# Patient Record
Sex: MALE | Race: WHITE | NOT HISPANIC OR LATINO | Employment: FULL TIME | URBAN - METROPOLITAN AREA
[De-identification: names, ages, dates, MRNs, and addresses within clinical notes are randomized per-mention and may not be internally consistent; named-entity substitution may affect disease eponyms.]

---

## 2021-06-21 ENCOUNTER — APPOINTMENT (OUTPATIENT)
Dept: GENERAL RADIOLOGY | Facility: HOSPITAL | Age: 41
End: 2021-06-21

## 2021-06-21 ENCOUNTER — HOSPITAL ENCOUNTER (EMERGENCY)
Facility: HOSPITAL | Age: 41
Discharge: HOME | End: 2021-06-21
Attending: EMERGENCY MEDICINE | Admitting: EMERGENCY MEDICINE

## 2021-06-21 VITALS
DIASTOLIC BLOOD PRESSURE: 91 MMHG | HEART RATE: 82 BPM | SYSTOLIC BLOOD PRESSURE: 120 MMHG | RESPIRATION RATE: 16 BRPM | OXYGEN SATURATION: 97 % | HEIGHT: 73 IN | TEMPERATURE: 97.5 F

## 2021-06-21 DIAGNOSIS — S63.259A DISLOCATION OF FINGER, INITIAL ENCOUNTER: Primary | ICD-10-CM

## 2021-06-21 PROCEDURE — 25010000003 LIDOCAINE 1 % SOLUTION: Performed by: EMERGENCY MEDICINE

## 2021-06-21 PROCEDURE — 99282 EMERGENCY DEPT VISIT SF MDM: CPT

## 2021-06-21 PROCEDURE — 73140 X-RAY EXAM OF FINGER(S): CPT

## 2021-06-21 RX ORDER — LIDOCAINE HYDROCHLORIDE 10 MG/ML
10 INJECTION, SOLUTION INFILTRATION; PERINEURAL ONCE
Status: COMPLETED | OUTPATIENT
Start: 2021-06-21 | End: 2021-06-21

## 2021-06-21 RX ADMIN — LIDOCAINE HYDROCHLORIDE 10 ML: 10 INJECTION, SOLUTION INFILTRATION; PERINEURAL at 22:17

## 2021-06-22 NOTE — ED PROVIDER NOTES
EMERGENCY DEPARTMENT ENCOUNTER    Room Number:  35/35  Date of encounter:  6/21/2021  PCP: Provider, No Known  Historian: Patient      HPI:  Chief Complaint: Right index finger pain  A complete HPI/ROS/PMH/PSH/SH/FH are unobtainable due to: N/A    Context: Jorgito Mason is a 40 y.o. 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  He injured his finger squatting unable.  Pain was abrupt in onset.  It is located in the right index finger does not radiate.  It is exacerbated with palpation attempted movement.  There are no alleviating factors.  No prior injuries.  He is otherwise healthy.      The patient was placed in a mask in triage, hand hygiene was performed before and after my interaction with the patient.  I wore a mask, safety glasses and gloves during my entire interaction with the patient.    PAST MEDICAL HISTORY  Active Ambulatory Problems     Diagnosis Date Noted   • No Active Ambulatory Problems     Resolved Ambulatory Problems     Diagnosis Date Noted   • No Resolved Ambulatory Problems     No Additional Past Medical History         PAST SURGICAL HISTORY  No past surgical history on file.      FAMILY HISTORY  No family history on file.      SOCIAL HISTORY  Social History     Socioeconomic History   • Marital status:      Spouse name: Not on file   • Number of children: Not on file   • Years of education: Not on file   • Highest education level: Not on file         ALLERGIES  Patient has no known allergies.        REVIEW OF SYSTEMS  Review of Systems   Musculoskeletal:        Right index finger pain         PHYSICAL EXAM    I have reviewed the triage vital signs and nursing notes.    ED Triage Vitals [06/21/21 2017]   Temp Heart Rate Resp BP SpO2   97.5 °F (36.4 °C) 89 16 123/89 97 %      Temp src Heart Rate Source Patient Position BP Location FiO2 (%)   Oral Monitor Standing Left arm --       Physical Exam   Constitutional: Pt. is  oriented to person, place, and time and well-developed, well-nourished, and in no distress.   Musculoskeletal: There is a posterior dislocation of the MIP of the right index finger.  Capillary refill is brisk.  Sensation is intact distally.  Remaining fingers and right hand exhibit normal range of motion and are without edema, tenderness or deformity.   Neurological: Pt. is alert and oriented to person, place, and time.  He has no focal neurologic deficits  Skin: Skin is warm and dry. No rash noted. Pt. is not diaphoretic. No erythema.   Psychiatric: Mood, affect and judgment normal.  He is pleasant and cooperative.  Nursing note and vitals reviewed.        LAB RESULTS  No results found for this or any previous visit (from the past 24 hour(s)).    Ordered the above labs and independently reviewed the results.        RADIOLOGY  XR Finger 2+ View Right    Result Date: 6/21/2021  X-RAYS OF THE RIGHT INDEX FINGER  CLINICAL HISTORY: Injury. Suspect dislocation.  3 views of the right index finger demonstrate posterior dislocation of the middle and distal phalanges with respect to the proximal phalanx by an entire shaft's width. No definite fracture is identified.  This report was finalized on 6/21/2021 9:12 PM by Dr. Con Amaya M.D.        I ordered the above noted radiological studies. Reviewed by me and discussed with radiologist.  See dictation for official radiology interpretation.      PROCEDURES    FX Dislocation    Date/Time: 6/21/2021 10:24 PM  Performed by: Jose Elias Jensen MD  Authorized by: Jose Elias Jensen MD     Consent:     Consent obtained:  Verbal    Consent given by:  Patient  Injury:     Injury location:  Finger    Finger injury location:  R index finger    Finger fracture type comment:  No fracture present    MCP joint involved: yes      IP joint involved: no    Pre-procedure assessment:     Neurological function: normal      Distal perfusion: normal      Range of motion: reduced    Anesthesia  (see MAR for exact dosages):     Anesthesia method:  Nerve block    Block needle gauge:  27 G    Block anesthetic:  Lidocaine 1% w/o epi    Block technique:  Digital    Block injection procedure:  Anatomic landmarks identified, introduced needle, negative aspiration for blood and incremental injection  Procedure details:     Manipulation performed: yes      Reduction successful: yes      Immobilization:  Splint and tape    Splint type:  Finger    Supplies used:  Aluminum splint  Post-procedure details:     Neurological function: normal      Distal perfusion: normal      Range of motion: normal      Patient tolerance of procedure:  Tolerated well, no immediate complications          MEDICATIONS GIVEN IN ER    Medications   lidocaine (XYLOCAINE) 1 % injection 10 mL (10 mL Injection Given by Other 6/21/21 2217)         PROGRESS, DATA ANALYSIS, CONSULTS, AND MEDICAL DECISION MAKING    Any/all labs have been independently reviewed by me.  Any/all radiology studies have been reviewed by me and discussed with radiologist dictating the report.   EKG's independently viewed and interpreted by me.  Discussion below represents my analysis of pertinent findings related to patient's condition, differential diagnosis, treatment plan and final disposition.           AS OF 22:34 EDT VITALS:    BP - 123/89  HR - 89  TEMP - 97.5 °F (36.4 °C) (Oral)  02 SATS - 97%        DIAGNOSIS  Final diagnoses:   Dislocation of finger, initial encounter         DISPOSITION  Discharged           Jose Elias Jensen MD  06/21/21 [id number]

## 2021-06-22 NOTE — ED NOTES
Pt ambulatory to triage from home with c/o right index finger dislocation - States was checked out by  at the [employer] pt is a ), and they think it's dislocated.  Pt states finger is numb now.  Pt wearing mask at triage.  Triage personnel wore appropriate PPE       Ebony Saenz RN  06/21/21 2018